# Patient Record
Sex: FEMALE | Race: WHITE | NOT HISPANIC OR LATINO | Employment: FULL TIME | URBAN - METROPOLITAN AREA
[De-identification: names, ages, dates, MRNs, and addresses within clinical notes are randomized per-mention and may not be internally consistent; named-entity substitution may affect disease eponyms.]

---

## 2022-06-09 ENCOUNTER — HOSPITAL ENCOUNTER (OUTPATIENT)
Facility: HOSPITAL | Age: 27
Setting detail: OBSERVATION
Discharge: HOME | End: 2022-06-10
Attending: EMERGENCY MEDICINE | Admitting: INTERNAL MEDICINE
Payer: COMMERCIAL

## 2022-06-09 DIAGNOSIS — F12.288 CANNABIS HYPEREMESIS SYNDROME CONCURRENT WITH AND DUE TO CANNABIS DEPENDENCE (HCC): ICD-10-CM

## 2022-06-09 DIAGNOSIS — R11.10 INTRACTABLE VOMITING: Primary | ICD-10-CM

## 2022-06-09 PROCEDURE — 93005 ELECTROCARDIOGRAM TRACING: CPT

## 2022-06-09 PROCEDURE — 99285 EMERGENCY DEPT VISIT HI MDM: CPT

## 2022-06-10 ENCOUNTER — APPOINTMENT (EMERGENCY)
Dept: CT IMAGING | Facility: HOSPITAL | Age: 27
End: 2022-06-10
Payer: COMMERCIAL

## 2022-06-10 ENCOUNTER — APPOINTMENT (EMERGENCY)
Dept: RADIOLOGY | Facility: HOSPITAL | Age: 27
End: 2022-06-10
Payer: COMMERCIAL

## 2022-06-10 VITALS
OXYGEN SATURATION: 99 % | HEART RATE: 64 BPM | BODY MASS INDEX: 25.71 KG/M2 | HEIGHT: 66 IN | RESPIRATION RATE: 18 BRPM | DIASTOLIC BLOOD PRESSURE: 54 MMHG | TEMPERATURE: 98.1 F | WEIGHT: 160 LBS | SYSTOLIC BLOOD PRESSURE: 99 MMHG

## 2022-06-10 PROBLEM — R11.2 INTRACTABLE NAUSEA AND VOMITING: Status: ACTIVE | Noted: 2022-06-10

## 2022-06-10 PROBLEM — F12.10 MARIJUANA ABUSE: Status: ACTIVE | Noted: 2022-06-10

## 2022-06-10 PROBLEM — D72.829 LEUKOCYTOSIS: Status: ACTIVE | Noted: 2022-06-10

## 2022-06-10 LAB
ALBUMIN SERPL BCP-MCNC: 3.8 G/DL (ref 3.5–5)
ALBUMIN SERPL BCP-MCNC: 5.1 G/DL (ref 3.5–5)
ALP SERPL-CCNC: 60 U/L (ref 46–116)
ALP SERPL-CCNC: 85 U/L (ref 46–116)
ALT SERPL W P-5'-P-CCNC: 23 U/L (ref 12–78)
ALT SERPL W P-5'-P-CCNC: 25 U/L (ref 12–78)
AMPHETAMINES SERPL QL SCN: NEGATIVE
ANION GAP SERPL CALCULATED.3IONS-SCNC: 14 MMOL/L (ref 4–13)
ANION GAP SERPL CALCULATED.3IONS-SCNC: 21 MMOL/L (ref 4–13)
APAP SERPL-MCNC: <2 UG/ML (ref 10–20)
AST SERPL W P-5'-P-CCNC: 13 U/L (ref 5–45)
AST SERPL W P-5'-P-CCNC: 16 U/L (ref 5–45)
ATRIAL RATE: 62 BPM
BACTERIA UR QL AUTO: ABNORMAL /HPF
BARBITURATES UR QL: NEGATIVE
BASE EXCESS BLDA CALC-SCNC: -2 MMOL/L (ref -2–3)
BASOPHILS # BLD MANUAL: 0 THOUSAND/UL (ref 0–0.1)
BASOPHILS NFR MAR MANUAL: 0 % (ref 0–1)
BENZODIAZ UR QL: NEGATIVE
BILIRUB SERPL-MCNC: 0.5 MG/DL (ref 0.2–1)
BILIRUB SERPL-MCNC: 1.2 MG/DL (ref 0.2–1)
BILIRUB UR QL STRIP: ABNORMAL
BUN SERPL-MCNC: 14 MG/DL (ref 5–25)
BUN SERPL-MCNC: 16 MG/DL (ref 5–25)
CA-I BLD-SCNC: 1.12 MMOL/L (ref 1.12–1.32)
CALCIUM SERPL-MCNC: 10.6 MG/DL (ref 8.3–10.1)
CALCIUM SERPL-MCNC: 8.2 MG/DL (ref 8.3–10.1)
CHLORIDE SERPL-SCNC: 101 MMOL/L (ref 100–108)
CHLORIDE SERPL-SCNC: 107 MMOL/L (ref 100–108)
CLARITY UR: CLEAR
CO2 SERPL-SCNC: 17 MMOL/L (ref 21–32)
CO2 SERPL-SCNC: 20 MMOL/L (ref 21–32)
COCAINE UR QL: NEGATIVE
COLOR UR: YELLOW
CREAT SERPL-MCNC: 0.74 MG/DL (ref 0.6–1.3)
CREAT SERPL-MCNC: 0.98 MG/DL (ref 0.6–1.3)
EOSINOPHIL # BLD MANUAL: 0 THOUSAND/UL (ref 0–0.4)
EOSINOPHIL NFR BLD MANUAL: 0 % (ref 0–6)
ERYTHROCYTE [DISTWIDTH] IN BLOOD BY AUTOMATED COUNT: 12.4 % (ref 11.6–15.1)
ERYTHROCYTE [DISTWIDTH] IN BLOOD BY AUTOMATED COUNT: 12.8 % (ref 11.6–15.1)
ETHANOL SERPL-MCNC: <3 MG/DL (ref 0–3)
EXT PREG TEST URINE: NEGATIVE
EXT. CONTROL ED NAV: NORMAL
GFR SERPL CREATININE-BSD FRML MDRD: 111 ML/MIN/1.73SQ M
GFR SERPL CREATININE-BSD FRML MDRD: 79 ML/MIN/1.73SQ M
GIANT PLATELETS BLD QL SMEAR: PRESENT
GLUCOSE SERPL-MCNC: 110 MG/DL (ref 65–140)
GLUCOSE SERPL-MCNC: 150 MG/DL (ref 65–140)
GLUCOSE SERPL-MCNC: 159 MG/DL (ref 65–140)
GLUCOSE UR STRIP-MCNC: NEGATIVE MG/DL
HCG SERPL QL: NEGATIVE
HCO3 BLDA-SCNC: 16.9 MMOL/L (ref 24–30)
HCT VFR BLD AUTO: 38.6 % (ref 34.8–46.1)
HCT VFR BLD AUTO: 43.7 % (ref 34.8–46.1)
HCT VFR BLD CALC: 45 % (ref 34.8–46.1)
HGB BLD-MCNC: 13 G/DL (ref 11.5–15.4)
HGB BLD-MCNC: 15.1 G/DL (ref 11.5–15.4)
HGB BLDA-MCNC: 15.3 G/DL (ref 11.5–15.4)
HGB UR QL STRIP.AUTO: NEGATIVE
KETONES UR STRIP-MCNC: ABNORMAL MG/DL
LACTATE SERPL-SCNC: 0.4 MMOL/L (ref 0.5–2)
LEUKOCYTE ESTERASE UR QL STRIP: NEGATIVE
LIPASE SERPL-CCNC: 45 U/L (ref 73–393)
LYMPHOCYTES # BLD AUTO: 1.02 THOUSAND/UL (ref 0.6–4.47)
LYMPHOCYTES # BLD AUTO: 7 % (ref 14–44)
MAGNESIUM SERPL-MCNC: 1.9 MG/DL (ref 1.6–2.6)
MCH RBC QN AUTO: 31.1 PG (ref 26.8–34.3)
MCH RBC QN AUTO: 31.3 PG (ref 26.8–34.3)
MCHC RBC AUTO-ENTMCNC: 33.7 G/DL (ref 31.4–37.4)
MCHC RBC AUTO-ENTMCNC: 34.6 G/DL (ref 31.4–37.4)
MCV RBC AUTO: 90 FL (ref 82–98)
MCV RBC AUTO: 93 FL (ref 82–98)
METHADONE UR QL: NEGATIVE
MONOCYTES # BLD AUTO: 0.58 THOUSAND/UL (ref 0–1.22)
MONOCYTES NFR BLD: 4 % (ref 4–12)
MUCOUS THREADS UR QL AUTO: ABNORMAL
NEUTROPHILS # BLD MANUAL: 12.93 THOUSAND/UL (ref 1.85–7.62)
NEUTS BAND NFR BLD MANUAL: 3 % (ref 0–8)
NEUTS SEG NFR BLD AUTO: 86 % (ref 43–75)
NITRITE UR QL STRIP: NEGATIVE
NON-SQ EPI CELLS URNS QL MICRO: ABNORMAL /HPF
OPIATES UR QL SCN: NEGATIVE
OXYCODONE+OXYMORPHONE UR QL SCN: NEGATIVE
P AXIS: 61 DEGREES
PCO2 BLD: 17 MMOL/L (ref 21–32)
PCO2 BLD: 18.5 MM HG (ref 42–50)
PCP UR QL: NEGATIVE
PH BLD: 7.57 [PH] (ref 7.3–7.4)
PH UR STRIP.AUTO: 6 [PH]
PLATELET # BLD AUTO: 245 THOUSANDS/UL (ref 149–390)
PLATELET # BLD AUTO: 306 THOUSANDS/UL (ref 149–390)
PLATELET BLD QL SMEAR: ADEQUATE
PMV BLD AUTO: 9.7 FL (ref 8.9–12.7)
PMV BLD AUTO: 9.8 FL (ref 8.9–12.7)
PO2 BLD: 83 MM HG (ref 35–45)
POTASSIUM BLD-SCNC: 4.1 MMOL/L (ref 3.5–5.3)
POTASSIUM SERPL-SCNC: 3.8 MMOL/L (ref 3.5–5.3)
POTASSIUM SERPL-SCNC: 4.4 MMOL/L (ref 3.5–5.3)
PR INTERVAL: 136 MS
PROT SERPL-MCNC: 6.9 G/DL (ref 6.4–8.2)
PROT SERPL-MCNC: 8.2 G/DL (ref 6.4–8.2)
PROT UR STRIP-MCNC: ABNORMAL MG/DL
QRS AXIS: 91 DEGREES
QRSD INTERVAL: 92 MS
QT INTERVAL: 468 MS
QTC INTERVAL: 475 MS
RBC # BLD AUTO: 4.15 MILLION/UL (ref 3.81–5.12)
RBC # BLD AUTO: 4.85 MILLION/UL (ref 3.81–5.12)
RBC #/AREA URNS AUTO: ABNORMAL /HPF
SALICYLATES SERPL-MCNC: 4.1 MG/DL (ref 3–20)
SAO2 % BLD FROM PO2: 98 % (ref 60–85)
SODIUM BLD-SCNC: 138 MMOL/L (ref 136–145)
SODIUM SERPL-SCNC: 139 MMOL/L (ref 136–145)
SODIUM SERPL-SCNC: 141 MMOL/L (ref 136–145)
SP GR UR STRIP.AUTO: >=1.03 (ref 1–1.03)
SPECIMEN SOURCE: ABNORMAL
T WAVE AXIS: 75 DEGREES
THC UR QL: POSITIVE
UROBILINOGEN UR QL STRIP.AUTO: 1 E.U./DL
VENTRICULAR RATE: 62 BPM
WBC # BLD AUTO: 14.53 THOUSAND/UL (ref 4.31–10.16)
WBC # BLD AUTO: 9.26 THOUSAND/UL (ref 4.31–10.16)
WBC #/AREA URNS AUTO: ABNORMAL /HPF

## 2022-06-10 PROCEDURE — 80053 COMPREHEN METABOLIC PANEL: CPT | Performed by: EMERGENCY MEDICINE

## 2022-06-10 PROCEDURE — 85007 BL SMEAR W/DIFF WBC COUNT: CPT | Performed by: EMERGENCY MEDICINE

## 2022-06-10 PROCEDURE — 99235 HOSP IP/OBS SAME DATE MOD 70: CPT | Performed by: HOSPITALIST

## 2022-06-10 PROCEDURE — 81025 URINE PREGNANCY TEST: CPT | Performed by: EMERGENCY MEDICINE

## 2022-06-10 PROCEDURE — 80053 COMPREHEN METABOLIC PANEL: CPT | Performed by: PHYSICIAN ASSISTANT

## 2022-06-10 PROCEDURE — 83735 ASSAY OF MAGNESIUM: CPT | Performed by: PHYSICIAN ASSISTANT

## 2022-06-10 PROCEDURE — 96375 TX/PRO/DX INJ NEW DRUG ADDON: CPT

## 2022-06-10 PROCEDURE — 82330 ASSAY OF CALCIUM: CPT

## 2022-06-10 PROCEDURE — 71045 X-RAY EXAM CHEST 1 VIEW: CPT

## 2022-06-10 PROCEDURE — C9113 INJ PANTOPRAZOLE SODIUM, VIA: HCPCS | Performed by: PHYSICIAN ASSISTANT

## 2022-06-10 PROCEDURE — 84132 ASSAY OF SERUM POTASSIUM: CPT

## 2022-06-10 PROCEDURE — 83690 ASSAY OF LIPASE: CPT | Performed by: EMERGENCY MEDICINE

## 2022-06-10 PROCEDURE — 96372 THER/PROPH/DIAG INJ SC/IM: CPT

## 2022-06-10 PROCEDURE — 74176 CT ABD & PELVIS W/O CONTRAST: CPT

## 2022-06-10 PROCEDURE — 81001 URINALYSIS AUTO W/SCOPE: CPT | Performed by: EMERGENCY MEDICINE

## 2022-06-10 PROCEDURE — 85027 COMPLETE CBC AUTOMATED: CPT | Performed by: EMERGENCY MEDICINE

## 2022-06-10 PROCEDURE — 82803 BLOOD GASES ANY COMBINATION: CPT

## 2022-06-10 PROCEDURE — 82947 ASSAY GLUCOSE BLOOD QUANT: CPT

## 2022-06-10 PROCEDURE — 85027 COMPLETE CBC AUTOMATED: CPT | Performed by: PHYSICIAN ASSISTANT

## 2022-06-10 PROCEDURE — 99285 EMERGENCY DEPT VISIT HI MDM: CPT | Performed by: EMERGENCY MEDICINE

## 2022-06-10 PROCEDURE — 96361 HYDRATE IV INFUSION ADD-ON: CPT

## 2022-06-10 PROCEDURE — 96374 THER/PROPH/DIAG INJ IV PUSH: CPT

## 2022-06-10 PROCEDURE — 80307 DRUG TEST PRSMV CHEM ANLYZR: CPT | Performed by: EMERGENCY MEDICINE

## 2022-06-10 PROCEDURE — 36415 COLL VENOUS BLD VENIPUNCTURE: CPT | Performed by: EMERGENCY MEDICINE

## 2022-06-10 PROCEDURE — 85014 HEMATOCRIT: CPT

## 2022-06-10 PROCEDURE — 93010 ELECTROCARDIOGRAM REPORT: CPT | Performed by: INTERNAL MEDICINE

## 2022-06-10 PROCEDURE — 83605 ASSAY OF LACTIC ACID: CPT | Performed by: PHYSICIAN ASSISTANT

## 2022-06-10 PROCEDURE — 84703 CHORIONIC GONADOTROPIN ASSAY: CPT | Performed by: EMERGENCY MEDICINE

## 2022-06-10 PROCEDURE — 84295 ASSAY OF SERUM SODIUM: CPT

## 2022-06-10 PROCEDURE — G1004 CDSM NDSC: HCPCS

## 2022-06-10 PROCEDURE — 82077 ASSAY SPEC XCP UR&BREATH IA: CPT | Performed by: EMERGENCY MEDICINE

## 2022-06-10 PROCEDURE — 80179 DRUG ASSAY SALICYLATE: CPT | Performed by: EMERGENCY MEDICINE

## 2022-06-10 PROCEDURE — 80143 DRUG ASSAY ACETAMINOPHEN: CPT | Performed by: EMERGENCY MEDICINE

## 2022-06-10 RX ORDER — KETOROLAC TROMETHAMINE 10 MG/1
10 TABLET, FILM COATED ORAL EVERY 6 HOURS PRN
COMMUNITY

## 2022-06-10 RX ORDER — DIPHENHYDRAMINE HYDROCHLORIDE 50 MG/ML
12.5 INJECTION INTRAMUSCULAR; INTRAVENOUS ONCE
Status: COMPLETED | OUTPATIENT
Start: 2022-06-10 | End: 2022-06-10

## 2022-06-10 RX ORDER — ONDANSETRON 2 MG/ML
1 INJECTION INTRAMUSCULAR; INTRAVENOUS ONCE
Status: COMPLETED | OUTPATIENT
Start: 2022-06-10 | End: 2022-06-10

## 2022-06-10 RX ORDER — PROMETHAZINE HYDROCHLORIDE 25 MG/ML
12.5 INJECTION, SOLUTION INTRAMUSCULAR; INTRAVENOUS ONCE
Status: COMPLETED | OUTPATIENT
Start: 2022-06-10 | End: 2022-06-10

## 2022-06-10 RX ORDER — HALOPERIDOL 5 MG/ML
5 INJECTION INTRAMUSCULAR ONCE
Status: COMPLETED | OUTPATIENT
Start: 2022-06-10 | End: 2022-06-10

## 2022-06-10 RX ORDER — ACETAMINOPHEN 325 MG/1
650 TABLET ORAL EVERY 6 HOURS PRN
Status: DISCONTINUED | OUTPATIENT
Start: 2022-06-10 | End: 2022-06-10 | Stop reason: HOSPADM

## 2022-06-10 RX ORDER — METOCLOPRAMIDE HYDROCHLORIDE 5 MG/ML
10 INJECTION INTRAMUSCULAR; INTRAVENOUS ONCE
Status: COMPLETED | OUTPATIENT
Start: 2022-06-10 | End: 2022-06-10

## 2022-06-10 RX ORDER — ONDANSETRON 2 MG/ML
4 INJECTION INTRAMUSCULAR; INTRAVENOUS EVERY 4 HOURS PRN
Status: DISCONTINUED | OUTPATIENT
Start: 2022-06-10 | End: 2022-06-10 | Stop reason: HOSPADM

## 2022-06-10 RX ORDER — METOCLOPRAMIDE HYDROCHLORIDE 5 MG/ML
5 INJECTION INTRAMUSCULAR; INTRAVENOUS EVERY 6 HOURS SCHEDULED
Status: DISCONTINUED | OUTPATIENT
Start: 2022-06-10 | End: 2022-06-10 | Stop reason: HOSPADM

## 2022-06-10 RX ORDER — SODIUM CHLORIDE, SODIUM GLUCONATE, SODIUM ACETATE, POTASSIUM CHLORIDE, MAGNESIUM CHLORIDE, SODIUM PHOSPHATE, DIBASIC, AND POTASSIUM PHOSPHATE .53; .5; .37; .037; .03; .012; .00082 G/100ML; G/100ML; G/100ML; G/100ML; G/100ML; G/100ML; G/100ML
125 INJECTION, SOLUTION INTRAVENOUS CONTINUOUS
Status: DISCONTINUED | OUTPATIENT
Start: 2022-06-10 | End: 2022-06-10 | Stop reason: HOSPADM

## 2022-06-10 RX ORDER — PROMETHAZINE HYDROCHLORIDE 25 MG/ML
12.5 INJECTION, SOLUTION INTRAMUSCULAR; INTRAVENOUS ONCE
Status: DISCONTINUED | OUTPATIENT
Start: 2022-06-10 | End: 2022-06-10

## 2022-06-10 RX ORDER — KETOROLAC TROMETHAMINE 10 MG/1
10 TABLET, FILM COATED ORAL EVERY 6 HOURS PRN
Status: DISCONTINUED | OUTPATIENT
Start: 2022-06-10 | End: 2022-06-10 | Stop reason: HOSPADM

## 2022-06-10 RX ORDER — PANTOPRAZOLE SODIUM 40 MG/1
40 INJECTION, POWDER, FOR SOLUTION INTRAVENOUS EVERY 12 HOURS SCHEDULED
Status: DISCONTINUED | OUTPATIENT
Start: 2022-06-10 | End: 2022-06-10 | Stop reason: HOSPADM

## 2022-06-10 RX ADMIN — HALOPERIDOL LACTATE 5 MG: 5 INJECTION, SOLUTION INTRAMUSCULAR at 04:17

## 2022-06-10 RX ADMIN — SODIUM CHLORIDE 1000 ML: 0.9 INJECTION, SOLUTION INTRAVENOUS at 00:30

## 2022-06-10 RX ADMIN — PANTOPRAZOLE SODIUM 40 MG: 40 INJECTION, POWDER, FOR SOLUTION INTRAVENOUS at 06:45

## 2022-06-10 RX ADMIN — SODIUM CHLORIDE, SODIUM GLUCONATE, SODIUM ACETATE, POTASSIUM CHLORIDE, MAGNESIUM CHLORIDE, SODIUM PHOSPHATE, DIBASIC, AND POTASSIUM PHOSPHATE 125 ML/HR: .53; .5; .37; .037; .03; .012; .00082 INJECTION, SOLUTION INTRAVENOUS at 07:02

## 2022-06-10 RX ADMIN — PROMETHAZINE HYDROCHLORIDE 12.5 MG: 25 INJECTION INTRAMUSCULAR; INTRAVENOUS at 02:25

## 2022-06-10 RX ADMIN — METOCLOPRAMIDE HYDROCHLORIDE 10 MG: 5 INJECTION INTRAMUSCULAR; INTRAVENOUS at 00:30

## 2022-06-10 RX ADMIN — DIPHENHYDRAMINE HYDROCHLORIDE 12.5 MG: 50 INJECTION INTRAMUSCULAR; INTRAVENOUS at 00:29

## 2022-06-10 RX ADMIN — SODIUM CHLORIDE 1000 ML: 0.9 INJECTION, SOLUTION INTRAVENOUS at 07:08

## 2022-06-10 RX ADMIN — METOCLOPRAMIDE HYDROCHLORIDE 5 MG: 5 INJECTION INTRAMUSCULAR; INTRAVENOUS at 11:53

## 2022-06-10 RX ADMIN — METOCLOPRAMIDE HYDROCHLORIDE 5 MG: 5 INJECTION INTRAMUSCULAR; INTRAVENOUS at 06:44

## 2022-06-10 NOTE — DISCHARGE SUMMARY
[place]  Discharge- John Camarillo 1995, 31 y o  female MRN: [id number]  Unit/Bed#: -01 Encounter: [id number]  Primary Care Provider: No primary care provider on file  Date and time admitted to hospital: 6/9/2022 11:58 PM    Leukocytosis  Assessment & Plan  · WBC at 14  53K   · No signs of acute infection   · Suspect reactionary to emesis   · Trend CBC  - resolved     Marijuana abuse  Assessment & Plan  · Smokes daily - reports she uses to prevent morning emesis     * Intractable nausea and vomiting  Assessment & Plan  · Onset of intractable emesis since 0530 on 6/9 - inability to tolerate PO intake   · 8 year hx of daily emesis with negative EGD 4-5 years ago per pt - reports she does not have emesis if she utilizes marijuana in the evening   · CO2 17, anion gap 21 (VBG with pH at 7 5) - given 1L NS recheck all labs and add lactic acid   · Given haldol 5mg, zofran 4mg IV, reglan 10mg IV, and phenergan 12 5mg in the ED with reports of ongoing dry heaves   · On admission - pt report nausea and epigastric abdominal pain but dry heaves has stopped   · Placed on reglan 5mg q6h, zofran PRN, and Tigan PRN as second line   · Resolved  · Tolerating po  · Marijuana cessation recommended  Non obstructive nephrolithiasis  Urology f/u or PCP f/u recommended  Non contributory to emesis     Hospital Course:     John Camarillo is a 31 y o  female patient who originally presented to the hospital on   Admission Orders (From admission, onward)     Ordered        06/10/22 0515  Place in Observation  Once                     due to emesis  Patient had negative CT scan  Patient tolerating diet at time of discharge  Marijuana cessation recommended  Incidental finding of nephrolithiasis as noted above  Please see above list of diagnoses and related plan for additional information       Physical Exam:    GEN: No acute distress, comfortable  HEEENT: No JVD, PERRLA, no scleral icterus  RESP: Lungs clear to auscultation bilaterally  CV: RRR, +s1/s2   ABD: SOFT NON TENDER, POSITIVE BOWEL SOUNDS, NO DISTENTION  PSYCH: CALM  NEURO: A X O X 3, NO FOCAL DEFICITS  SKIN: NO RASH  EXTREM: NO EDEMA      Condition at Discharge:  good      Discharge instructions/Information to patient and family:   See after visit summary for information provided to patient and family  Provisions for Follow-Up Care:  See after visit summary for information related to follow-up care and any pertinent home health orders  Disposition:     Home       Discharge Statement:  I spent 25 minutes discharging the patient  This time was spent on the day of discharge  I had direct contact with the patient on the day of discharge  Greater than 50% of the total time was spent examining patient, answering all patient questions, arranging and discussing plan of care with patient as well as directly providing post-discharge instructions  Additional time then spent on discharge activities  Discharge Medications:  See after visit summary for reconciled discharge medications provided to patient and family        ** Please Note: This note has been constructed using a voice recognition system **

## 2022-06-10 NOTE — ASSESSMENT & PLAN NOTE
· Onset of intractable emesis since 0530 on 6/9 - inability to tolerate PO intake   · 8 year hx of daily emesis with negative EGD 4-5 years ago per pt - reports she does not have emesis if she utilizes marijuana in the evening   · CO2 17, anion gap 21 (VBG with pH at 7 5) - given 1L NS recheck all labs and add lactic acid   · Given haldol 5mg, zofran 4mg IV, reglan 10mg IV, and phenergan 12 5mg in the ED with reports of ongoing dry heaves   · On admission - pt report nausea and epigastric abdominal pain but dry heaves has stopped   · Placed on reglan 5mg q6h, zofran PRN, and Tigan PRN as second line   · Give additional 1L NS and place on continuous  · If inability to tolerate PO intake on scheduled Reglan - consider GI consult   · Diet of ice chips to start - advance as able

## 2022-06-10 NOTE — H&P
[facility]  H&P- Mazin Sanchez 1995, 31 y o  female MRN: [id number]  Unit/Bed#: -01 Encounter: [id number]  Primary Care Provider: No primary care provider on file  Date and time admitted to hospital: 6/9/2022 11:58 PM    Intractable nausea and vomiting  Assessment & Plan  · Onset of intractable emesis since 0530 on 6/9 - inability to tolerate PO intake   · 8 year hx of daily emesis with negative EGD 4-5 years ago per pt - reports she does not have emesis if she utilizes marijuana in the evening   · CO2 17, anion gap 21 (VBG with pH at 7 5) - given 1L NS recheck all labs and add lactic acid   · Given haldol 5mg, zofran 4mg IV, reglan 10mg IV, and phenergan 12 5mg in the ED with reports of ongoing dry heaves   · On admission - pt report nausea and epigastric abdominal pain but dry heaves has stopped   · Placed on reglan 5mg q6h, zofran PRN, and Tigan PRN as second line   · Give additional 1L NS and place on continuous  · If inability to tolerate PO intake on scheduled Reglan - consider GI consult   · Diet of ice chips to start - advance as able     Marijuana abuse  Assessment & Plan  · Smokes daily - reports she uses to prevent morning emesis     Leukocytosis  Assessment & Plan  · WBC at 14  53K   · No signs of acute infection   · Suspect reactionary to emesis   · Trend CBC     VTE Pharmacologic Prophylaxis: VTE Score: 1 Low Risk (Score 0-2) - Encourage Ambulation  Code Status: Level 1 - Full Code   Discussion with family: significant other updated at bedside  Anticipated Length of Stay: Patient will be admitted on an observation basis with an anticipated length of stay of less than 2 midnights secondary to Intractable nausea and vomiting, marijuana abuse, leukocytosis  Total Time for Visit, including Counseling / Coordination of Care: 45 minutes Greater than 50% of this total time spent on direct patient counseling and coordination of care      Chief Complaint: "Vomiting since 0530"    History of Present Illness:  Sandy Rizvi is a 31 y o  female with a PMH of marijuana use and nerve impingement in lower back on Toradol p r n  who presents with intractable nausea and vomiting since 0530 on 06/09  Patient reports she was [place] from [place] a [employer]  She attended the concert and then developed intractable nausea and emesis yesterday morning  Endorses epigastric abdominal pain that is nonradiating  Worsened with emesis  Inability to tolerate p o  intake yesterday  No urinary symptoms  Admits to myalgias secondary to emesis  Patient reports she has experienced daily emesis for the last 8 years  She has not required hospitalization previously  She reports she smokes marijuana nightly to prevent morning emesis  She did smoke marijuana Thursday evening at 5:30 p m  before the concert  She reports she had an EGD 4-5 years ago that was negative  She has not seen any other GI doctors for emesis  Significant other at bedside reports that patient had a panic attack associated with the emesis  No recent illnesses  No fevers or chills  No chest pain  Review of Systems:  Review of Systems   Constitutional: Negative for chills and fever  HENT: Negative for congestion  Respiratory: Negative for shortness of breath  Cardiovascular: Negative for chest pain and leg swelling  Gastrointestinal: Positive for abdominal pain, nausea and vomiting  Negative for blood in stool, constipation and diarrhea  Genitourinary: Negative for difficulty urinating, dysuria and hematuria  Musculoskeletal: Positive for back pain and myalgias  Negative for gait problem  Neurological: Negative for weakness and numbness  Psychiatric/Behavioral: The patient is nervous/anxious  All other systems reviewed and are negative  Past Medical and Surgical History:   History reviewed  No pertinent past medical history      Past Surgical History:   Procedure Laterality Date    INSERTION OF INTRAUTERINE DEVICE (IUD)         Meds/Allergies:  Prior to Admission medications    Medication Sig Start Date End Date Taking? Authorizing Provider   ketorolac (TORADOL) 10 mg tablet Take 10 mg by mouth every 6 (six) hours as needed for moderate pain   Yes Historical Provider, MD     I have reviewed home medications with patient personally  Allergies: No Known Allergies    Social History:  Marital Status: Single   Occupation:  Works as a supervisor  Patient Pre-hospital Living Situation: Home, With other family member: Significant other  Patient Pre-hospital Level of Mobility: walks  Patient Pre-hospital Diet Restrictions:  None  Substance Use History:   Social History     Substance and Sexual Activity   Alcohol Use Yes    Comment: socially     Social History     Tobacco Use   Smoking Status Not on file   Smokeless Tobacco Not on file     Social History     Substance and Sexual Activity   Drug Use Never       Family History:  History reviewed  No pertinent family history  Physical Exam:     Vitals:   Blood Pressure: 120/73 (06/10/22 0559)  Pulse: 73 (06/10/22 0559)  Temperature: 98 4 °F (36 9 °C) (06/10/22 0559)  Temp Source: Oral (06/10/22 0559)  Respirations: 18 (06/10/22 0559)  Height: 5' 6" (167 6 cm) (06/10/22 0000)  Weight - Scale: 72 6 kg (160 lb) (06/10/22 0000)  SpO2: 97 % (06/10/22 0559)    Physical Exam  Vitals and nursing note reviewed  Constitutional:       Appearance: Normal appearance  HENT:      Head: Normocephalic  Mouth/Throat:      Mouth: Mucous membranes are dry  Eyes:      Extraocular Movements: Extraocular movements intact  Conjunctiva/sclera: Conjunctivae normal    Cardiovascular:      Rate and Rhythm: Normal rate and regular rhythm  Pulses: Normal pulses  Heart sounds: No murmur heard  Pulmonary:      Effort: Pulmonary effort is normal       Breath sounds: Normal breath sounds  Abdominal:      General: Abdomen is flat   There is no distension  Palpations: Abdomen is soft  Tenderness: There is abdominal tenderness (Epigastric)  There is no guarding or rebound  Comments: No active dry heaves or emesis on exam   Tolerating ice chips  Musculoskeletal:         General: Normal range of motion  Cervical back: Normal range of motion  Right lower leg: No edema  Left lower leg: No edema  Skin:     General: Skin is warm and dry  Coloration: Skin is not pale  Neurological:      General: No focal deficit present  Mental Status: She is alert and oriented to person, place, and time  Psychiatric:         Mood and Affect: Mood normal          Thought Content: Thought content normal           Additional Data:     Lab Results:  Results from last 7 days   Lab Units 06/10/22  0041 06/10/22  0029   WBC Thousand/uL  --  14 53*   HEMOGLOBIN g/dL  --  15 1   I STAT HEMOGLOBIN g/dl 15 3  --    HEMATOCRIT %  --  43 7   HEMATOCRIT, ISTAT % 45  --    PLATELETS Thousands/uL  --  306   BANDS PCT %  --  3   LYMPHO PCT %  --  7*   MONO PCT %  --  4   EOS PCT %  --  0     Results from last 7 days   Lab Units 06/10/22  0041 06/10/22  0029   SODIUM mmol/L  --  139   POTASSIUM mmol/L  --  4 4   CHLORIDE mmol/L  --  101   CO2 mmol/L  --  17*   CO2, I-STAT mmol/L 17*  --    BUN mg/dL  --  16   CREATININE mg/dL  --  0 98   ANION GAP mmol/L  --  21*   CALCIUM mg/dL  --  10 6*   ALBUMIN g/dL  --  5 1*   TOTAL BILIRUBIN mg/dL  --  1 20*   ALK PHOS U/L  --  85   ALT U/L  --  25   AST U/L  --  16   GLUCOSE RANDOM mg/dL  --  150*                       Imaging: Reviewed radiology reports from this admission including: abdominal/pelvic CT  CT abdomen pelvis wo contrast   Final Result by Charles Esquivel MD (06/10 0406)      1  Nonobstructive left nephrolithiasis measuring up to 2 mm  No hydronephrosis  2   Intrauterine device in appropriate orientation              Workstation performed: [id number]         XR chest portable   ED Interpretation by Maira Mustafa DO (06/10 2309)   No acute abnl          EKG and Other Studies Reviewed on Admission:   · EKG: NSR with QT at 478ms  Recheck EKG later today       ** Please Note: This note has been constructed using a voice recognition system   **

## 2022-06-10 NOTE — ASSESSMENT & PLAN NOTE
· Onset of intractable emesis since 0530 on 6/9 - inability to tolerate PO intake   · 8 year hx of daily emesis with negative EGD 4-5 years ago per pt - reports she does not have emesis if she utilizes marijuana in the evening   · CO2 17, anion gap 21 (VBG with pH at 7 5) - given 1L NS recheck all labs and add lactic acid   · Given haldol 5mg, zofran 4mg IV, reglan 10mg IV, and phenergan 12 5mg in the ED with reports of ongoing dry heaves   · On admission - pt report nausea and epigastric abdominal pain but dry heaves has stopped   · Placed on reglan 5mg q6h, zofran PRN, and Tigan PRN as second line   · Resolved  · Tolerating po  · Marijuana cessation recommended

## 2022-06-10 NOTE — NURSING NOTE
Patient denies any nausea or vomiting since about 5:30am today  Patient tolerating sips of water from melted ice  Advanced as tolerated as per FLORENTIN Laura's previous note  Patient given apple juice and more ice chips

## 2022-06-10 NOTE — CASE MANAGEMENT
Case Management Assessment & Discharge Planning Note    Patient name Kristopher Thayer  Location [facility] [address] 223/-[id number] MRN [id number]  : 1995 Date 6/10/2022       Current Admission Date: 2022  Current Admission Diagnosis:Intractable nausea and vomiting   Patient Active Problem List    Diagnosis Date Noted    Intractable nausea and vomiting 06/10/2022    Marijuana abuse 06/10/2022    Leukocytosis 06/10/2022      LOS (days): 0  Geometric Mean LOS (GMLOS) (days):   Days to GMLOS:     OBJECTIVE:              Current admission status: Observation       Preferred Pharmacy:   PATIENT/FAMILY REPORTS NO PREFERRED PHARMACY  No address on file      Primary Care Provider: No primary care provider on file  Primary Insurance:   Secondary Insurance:     ASSESSMENT:  Active Health Care Proxies    There are no active Health Care Proxies on file         Advance Directives  Does patient have a [address]?: No  Was patient offered paperwork?: Yes (declined)  Does patient currently have a Health Care decision maker?: No  Does patient have Advance Directives?: No  Was patient offered paperwork?: Yes (declined)  Primary Contact: [id number]              Patient Information  Admitted from[de-identified] Home  Mental Status: Alert  During Assessment patient was accompanied by: Spouse (SO)  Assessment information provided by[de-identified] Parent  Primary Caregiver: Self  Support Systems: Self  What city do you live in?: [place]  Type of Current Residence: Apartment  Floor Level: 1  Upon entering residence, is there a bedroom on the main floor (no further steps)?: Yes  Upon entering residence, is there a bathroom on the main floor (no further steps)?: Yes  In the last 12 months, was there a time when you were not able to pay the mortgage or rent on time?: No  In the last 12 months, how many places have you lived?: 1  In the last 12 months, was there a time when you did not have a steady place to sleep or slept in a shelter (including now)?: No  Homeless/housing insecurity resource given?: N/A  Living Arrangements: Lives w/ Spouse/significant other  Is patient a ?: No    Activities of Daily Living Prior to Admission  Functional Status: Independent  Completes ADLs independently?: Yes  Ambulates independently?: Yes  Does patient use assisted devices?: No  Does patient currently own DME?: No  Does patient have a history of Outpatient Therapy (PT/OT)?: Yes  Does the patient have a history of Short-Term Rehab?: No  Does patient have a history of HHC?: No  Does patient currently have [employer] ?: No         Patient Information Continued  Income Source: Employed  Does patient have prescription coverage?: Yes  Within the past 12 months, you worried that your food would run out before you got the money to buy more : Never true  Within the past 12 months, the food you bought just didn't last and you didn't have money to get more : Never true  Food insecurity resource given?: N/A  Does patient receive dialysis treatments?: No  Does patient have a history of substance abuse?: No  Does patient have a history of Mental Health Diagnosis?: Yes (anxiety)  Is patient receiving treatment for mental health?: No  Patient declined treatment information    Has patient received inpatient treatment related to mental health in the last 2 years?: Yes ([facility] at age 12)         Means of Transportation  [facility]ans of Transport to [facility]de[facility]identified] Drives Self  In the past 12 months, has lack of transportation kept you from medical appointments or from getting medications?: No  In the past 12 months, has lack of transportation kept you from meetings, work, or from getting things needed for daily living?: No  Was application for public transport provided?: N/A        DISCHARGE DETAILS:    Discharge planning discussed with[de-identified] patient  Freedom of Choice: Yes  Comments - Freedom of Choice: Patient plans on returning home at discharge and does not anticipate any discharge needs  Additional Comments: Patient lives in [place] - here for a concert in [place]  She lives with SO in a first floor apartment, is independent, drives, works  No services  SO will transport home

## 2022-06-10 NOTE — ED PROVIDER NOTES
History  Chief Complaint   Patient presents with    Vomiting     Pt brought in for vomitting and sob that started yesterday  58-year-old female with history of anxiety visiting from [facility]) complains of persistent vomiting with shortness of breath, paresthesias of hands and perioral region, carpal spasm and myalgias all day today  This never happened to her before  She states that she had smoked some marijuana and drank alcohol at a concert last night but otherwise does not take illicit drugs  She denies recent sore throat, fever, cough, palpitations, diarrhea, bloody stool and dysuria  Patient has IUD and does not believe that she is pregnant  She was unable to keep any fluids down today  None       History reviewed  No pertinent past medical history  History reviewed  No pertinent surgical history  History reviewed  No pertinent family history  I have reviewed and agree with the history as documented  E-Cigarette/Vaping    E-Cigarette Use Never User      E-Cigarette/Vaping Substances    THC Yes      Social History     Vaping Use    Vaping Use: Never used   Substance Use Topics    Alcohol use: Yes     Comment: socially    Drug use: Never       Review of Systems   Constitutional: Positive for appetite change  Negative for fever  Respiratory: Positive for chest tightness and shortness of breath  Negative for cough  Cardiovascular: Negative for palpitations and leg swelling  Gastrointestinal: Positive for nausea and vomiting  Negative for abdominal pain, blood in stool and diarrhea  Genitourinary: Negative for difficulty urinating and dysuria  Musculoskeletal: Positive for myalgias  Negative for neck stiffness  Skin: Negative for rash  Neurological: Positive for weakness and numbness (Marta oral and hands)  Negative for dizziness, seizures, syncope, facial asymmetry, speech difficulty and headaches     Psychiatric/Behavioral: Negative for hallucinations, self-injury and suicidal ideas  The patient is nervous/anxious  All other systems reviewed and are negative  Physical Exam  Physical Exam  Vitals and nursing note reviewed  Constitutional:       General: She is in acute distress  Appearance: She is well-developed and normal weight  She is ill-appearing  She is not diaphoretic  HENT:      Head: Normocephalic and atraumatic  Right Ear: External ear normal       Left Ear: External ear normal       Nose: Nose normal       Mouth/Throat:      Mouth: Mucous membranes are dry  Pharynx: Oropharynx is clear  Eyes:      General: No scleral icterus  Conjunctiva/sclera: Conjunctivae normal       Pupils: Pupils are equal, round, and reactive to light  Cardiovascular:      Rate and Rhythm: Normal rate and regular rhythm  Pulses: Normal pulses  Heart sounds: Normal heart sounds  No murmur heard  Pulmonary:      Effort: Pulmonary effort is normal       Breath sounds: Normal breath sounds  Abdominal:      General: Bowel sounds are normal       Palpations: Abdomen is soft  Tenderness: There is no abdominal tenderness  There is no guarding or rebound  Musculoskeletal:         General: No tenderness  Normal range of motion  Cervical back: Normal range of motion and neck supple  No rigidity or tenderness  Right lower leg: No edema  Left lower leg: No edema  Skin:     General: Skin is warm and dry  Capillary Refill: Capillary refill takes less than 2 seconds  Findings: Bruising present  No rash  Neurological:      General: No focal deficit present  Mental Status: She is alert and oriented to person, place, and time  Cranial Nerves: No cranial nerve deficit  Sensory: No sensory deficit  Motor: No weakness  Coordination: Coordination normal       Deep Tendon Reflexes: Reflexes are normal and symmetric     Psychiatric:         Attention and Perception: Attention and perception normal          Mood and Affect: Mood is anxious  Speech: Speech normal          Behavior: Behavior normal  Behavior is cooperative  Thought Content: Thought content normal          Cognition and Memory: Cognition and memory normal          Vital Signs  ED Triage Vitals [06/10/22 0000]   Temperature Pulse Respirations Blood Pressure SpO2   (!) 97 2 °F (36 2 °C) 59 18 128/74 100 %      Temp Source Heart Rate Source Patient Position - Orthostatic VS BP Location FiO2 (%)   Temporal Monitor Lying Right arm --      Pain Score       5           Vitals:    06/10/22 0000   BP: 128/74   Pulse: 59   Patient Position - Orthostatic VS: Lying         Visual Acuity      ED Medications  Medications   ondansetron (FOR EMS ONLY) (ZOFRAN) 4 mg/2 mL injection 4 mg (has no administration in time range)       Diagnostic Studies  Results Reviewed     None                 No orders to display              Procedures  ECG 12 Lead Documentation Only    Date/Time: 6/10/2022 12:10 AM  Performed by: Swapna Sierra DO  Authorized by: Swapna Sierra DO     ECG reviewed by me, the ED Provider: yes    Patient location:  ED  Previous ECG:     Previous ECG:  Unavailable    Comparison to cardiac monitor: Yes    Rate:     ECG rate assessment: normal    Rhythm:     Rhythm: sinus rhythm    Ectopy:     Ectopy: none    QRS:     QRS axis:  Right    QRS intervals:  Normal  Conduction:     Conduction: normal    ST segments:     ST segments:  Normal  T waves:     T waves: normal               ED Course            IV hydration, antiemetic and lab work  EKG reviewed  Signed out to Dr Lisa Gomez at end of shift  MDM  Number of Diagnoses or Management Options  Diagnosis management comments: Hyperemesis with hyperventilation syndrome today  Patient does have history of anxiety and is fairly frequent vomiting but never had it this bad before    She denies recent illness and intoxication except for a little marijuana and some alcohol at a concert last night  Will give fluids, antiemetic and check labs  Rule out dehydration, JOSHUA, salicylate or other intoxicant  Amount and/or Complexity of Data Reviewed  Clinical lab tests: ordered  Review and summarize past medical records: yes  Discuss the patient with other providers: yes  Independent visualization of images, tracings, or specimens: yes        Disposition  Final diagnoses:   None     ED Disposition     None      Follow-up Information    None         Patient's Medications    No medications on file       No discharge procedures on file      PDMP Review     None          ED Provider  Electronically Signed by           Augusta Vargas DO  06/10/22 6101

## 2022-06-10 NOTE — ASSESSMENT & PLAN NOTE
· WBC at 14  53K   · No signs of acute infection   · Suspect reactionary to emesis   · Trend CBC  - resolved

## 2022-06-10 NOTE — ED CARE HANDOFF
Emergency Department Sign Out Note        Sign out and transfer of care from [facility]*  See Separate Emergency Department note  The patient, Phyllis Gan, was evaluated by the previous provider for **nausea, vomiting, myalgias, hyperventilating all day*  Workup Completed:  No relief with zofran, reglan, benadryl iv fluids ordered, labs pending    ED Course / Workup Pending (followup):  EKG NSR, right axis                                      ED Course as of 06/10/22 0730   Fri Carson 10, 2022   0206 Still hyperventilating and nausea - trying phenergan now   0426 Still vomiting - giving Haldol   0515 Persistent vomiting bilious fluid, she states the Haldol helped her sleep a little bit but now it is dry heaves  Patient is from [facility]) and flew down to go to the cold play concert  She said she has had vomiting issues for 8 years and has gone to ERs before  Someone told her to use cannabis gummies for vomiting but she smokes instead  She never saw GI  She did smoke around 5:00 p m   Before the concert   6411 Patient denies urinary symptoms, urine sample probably contaminant     ECG 12 Lead Documentation Only    Date/Time: 6/10/2022 5:22 AM  Performed by: Rosanna Trejo DO  Authorized by: Rosanna Trejo DO     Indications / Diagnosis:  Vomiting  ECG reviewed by me, the ED Provider: yes    Patient location:  ED  Previous ECG:     Previous ECG:  Unavailable  Interpretation:     Interpretation: non-specific    Rate:     ECG rate:  62    ECG rate assessment: normal    Rhythm:     Rhythm: sinus rhythm    Ectopy:     Ectopy: none    QRS:     QRS axis:  Normal    QRS intervals:  Normal  Conduction:     Conduction: normal    ST segments:     ST segments:  Normal  T waves:     T waves: normal    Comments:      Borderline prolonged QTc 475      MDM  Number of Diagnoses or Management Options  Cannabis hyperemesis syndrome concurrent with and due to cannabis dependence [facility]): new and requires workup  Intractable vomiting: new and requires workup     Amount and/or Complexity of Data Reviewed  Clinical lab tests: ordered and reviewed  Tests in the radiology section of CPT®: ordered and reviewed  Obtain history from someone other than the patient: yes  Discuss the patient with other providers: yes    Patient Progress  Patient progress: improved          Disposition  Final diagnoses:   Intractable vomiting   Cannabis hyperemesis syndrome concurrent with and due to cannabis dependence ([address] )     Time reflects when diagnosis was documented in both MDM as applicable and the Disposition within this note     Time User Action Codes Description Comment    6/10/2022  5:14 AM Lakeshia Chyle Add [R11 10] Intractable vomiting     6/10/2022  5:14 AM Jerline Chyle Add [F12 288] Cannabis hyperemesis syndrome concurrent with and due to cannabis dependence [facility])       ED Disposition     ED Disposition   Admit    Condition   Stable    Date/Time   Fri Carson 10, 2022  5:14 AM    Comment   Case was discussed with Arsh Osei and the patient's admission status was agreed to be Admission Status: observation status to the service of Dr Gwen Dailey**   Follow-up Information    None       Current Discharge Medication List      CONTINUE these medications which have NOT CHANGED    Details   ketorolac (TORADOL) 10 mg tablet Take 10 mg by mouth every 6 (six) hours as needed for moderate pain           No discharge procedures on file         ED Provider  Electronically Signed by     Vipin Pierre DO  06/10/22 7664